# Patient Record
Sex: MALE | Race: WHITE | NOT HISPANIC OR LATINO | URBAN - METROPOLITAN AREA
[De-identification: names, ages, dates, MRNs, and addresses within clinical notes are randomized per-mention and may not be internally consistent; named-entity substitution may affect disease eponyms.]

---
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
## 2023-01-10 NOTE — PROGRESS NOTES
Chief Complaint   Patient presents with   • Other     Hair loss       HISTORY OF PRESENT ILLNESS: Patient is a 17 y.o. male established patient who presents today to discuss the following issues:    Hair loss  Feels like his hair is thinning, especially near his temples.  Denies pulling or twirling his hair.  Long discussion of differential diagnoses.  Would like to proceed with lab work.    Dizziness  Reports occasional orthostatic hypotension.  Discussed standing up slowly.      Patient Active Problem List    Diagnosis Date Noted   • Hair loss 09/13/2017   • Dizziness 09/13/2017   • Right ureteral stone 10/14/2015   • Personal history of kidney stones 04/24/2015   • Obesity, pediatric, BMI 95th to 98th percentile for age 04/24/2015       Allergies:Nkda [no known drug allergy]    No current outpatient prescriptions on file.     No current facility-administered medications for this visit.        Social History   Substance Use Topics   • Smoking status: Light Tobacco Smoker   • Smokeless tobacco: Never Used   • Alcohol use No       Family Status   Relation Status   • Mother Alive   • Father Alive   • Brother Alive   • Maternal Grandmother Alive   • Maternal Grandfather Alive   • Paternal Grandmother Alive   • Paternal Grandfather Alive   • Brother Alive   • Other    • Other    • Neg Hx      Family History   Problem Relation Age of Onset   • Allergies Mother      seasonal   • Heart Disease Father      irregular heart beat   • Allergies Father      seasonal   • Lung Disease Father      seasonal asthma   • Hypertension Father      borderline   • Psychiatry Paternal Grandmother      paranoid schizophrenic, bipolar   • Heart Disease Paternal Grandfather    • GI Other      hernia with boys on dad's side   • Cancer Other      breast - MGGM   • Diabetes Neg Hx    • Hyperlipidemia Neg Hx    • Stroke Neg Hx    • Thyroid Neg Hx        Review of Systems:   Constitutional: Negative for fever, chills, weight loss and  Summer Polanco is scheduled for colonoscopy with Dr Des Mello on 3/16/23 at 900am. Patient was told to arrive at 800am. Patient needs to be NPO after midnight the night before procedure. All surgery instructions were explained to the patient and a surgery letter was also mailed out. MA informed patient that PAT will also be calling to review pre-op instructions and medications. Patient verbalized understanding. "malaise/fatigue.  Reports subjective hair thinning.  HENT: Negative for ear pain, nosebleeds, congestion, sore throat and neck pain.    Eyes: Negative for blurred vision.   Respiratory: Negative for cough, sputum production, shortness of breath and wheezing.    Cardiovascular: Negative for chest pain, palpitations, orthopnea and leg swelling.   Gastrointestinal: Negative for heartburn, nausea, vomiting and abdominal pain.   Genitourinary: Negative for dysuria, urgency and frequency.   Musculoskeletal: Negative for myalgias, joint pain, and back pain.  Skin: Negative for rash and itching.   Neurological: Negative for dizziness, tingling, tremors, sensory change, focal weakness and headaches.   Endo/Heme/Allergies: Does not bruise/bleed easily.   Psychiatric/Behavioral: Negative for depression, suicidal ideas and memory loss.  The patient is not nervous/anxious and does not have insomnia.    All other systems reviewed and are negative except as in HPI.    Exam:  Blood pressure 108/68, pulse 98, temperature 37.8 °C (100.1 °F), resp. rate 16, height 1.956 m (6' 5\"), weight 80.3 kg (177 lb), SpO2 94 %.  General:  Well nourished, well developed male in NAD   Head: Grossly normal.  Neck: Supple without JVD or bruit. Thyroid is not enlarged.  Pulmonary: Clear to ausculation. Normal effort. No rales, ronchi, or wheezing.  Cardiovascular: Regular rate and rhythm without murmur.   Extremities: No clubbing, cyanosis, or edema.  Skin: Intact with no obvious rashes or lesions. No obvious hair loss.  Neuro: Grossly intact.  Psych: Alert and oriented x 3.  Mood and affect appropriate.    Medical decision-making and discussion: Libby is here today to discuss hair thinning.  Lab work was ordered, and he will follow-up here as needed.          Assessment/Plan:  1. Fatigue, unspecified type  CBC WITH DIFFERENTIAL    BASIC METABOLIC PANEL    TSH    VITAMIN D,25 HYDROXY   2. Hair loss  CBC WITH DIFFERENTIAL    TSH    TRIIDOTHYRONINE    " FREE THYROXINE    IRON/TOTAL IRON BIND   3. Dizziness  CBC WITH DIFFERENTIAL    BASIC METABOLIC PANEL    TSH    IRON/TOTAL IRON BIND       Return if symptoms worsen or fail to improve.    Please note that this dictation was created using voice recognition software. I have made every reasonable attempt to correct obvious errors, but I expect that there are errors of grammar and possibly content that I did not discover before finalizing the note.